# Patient Record
Sex: FEMALE | Race: WHITE | NOT HISPANIC OR LATINO | Employment: FULL TIME | URBAN - METROPOLITAN AREA
[De-identification: names, ages, dates, MRNs, and addresses within clinical notes are randomized per-mention and may not be internally consistent; named-entity substitution may affect disease eponyms.]

---

## 2017-05-16 ENCOUNTER — HISTORICAL (OUTPATIENT)
Dept: LAB | Facility: HOSPITAL | Age: 32
End: 2017-05-16

## 2017-05-16 LAB
ABS NEUT (OLG): 2.84 X10(3)/MCL (ref 2.1–9.2)
B-HCG SERPL QL: NEGATIVE
BASOPHILS # BLD AUTO: 0 X10(3)/MCL (ref 0–0.2)
BASOPHILS NFR BLD AUTO: 1 %
BUN SERPL-MCNC: 9 MG/DL (ref 7–18)
CALCIUM SERPL-MCNC: 9.5 MG/DL (ref 8.5–10.1)
CHLORIDE SERPL-SCNC: 105 MMOL/L (ref 98–107)
CO2 SERPL-SCNC: 25 MMOL/L (ref 21–32)
CREAT SERPL-MCNC: 0.63 MG/DL (ref 0.55–1.02)
EOSINOPHIL # BLD AUTO: 0 X10(3)/MCL (ref 0–0.9)
EOSINOPHIL NFR BLD AUTO: 1 %
ERYTHROCYTE [DISTWIDTH] IN BLOOD BY AUTOMATED COUNT: 12.9 % (ref 11.5–17)
GLUCOSE SERPL-MCNC: 85 MG/DL (ref 74–106)
HCT VFR BLD AUTO: 43.8 % (ref 37–47)
HGB BLD-MCNC: 14.4 GM/DL (ref 12–16)
LYMPHOCYTES # BLD AUTO: 1.8 X10(3)/MCL (ref 0.6–4.6)
LYMPHOCYTES NFR BLD AUTO: 35 %
MCH RBC QN AUTO: 28.6 PG (ref 27–31)
MCHC RBC AUTO-ENTMCNC: 32.9 GM/DL (ref 33–36)
MCV RBC AUTO: 87.1 FL (ref 80–94)
MONOCYTES # BLD AUTO: 0.4 X10(3)/MCL (ref 0.1–1.3)
MONOCYTES NFR BLD AUTO: 8 %
NEUTROPHILS # BLD AUTO: 2.84 X10(3)/MCL (ref 2.1–9.2)
NEUTROPHILS NFR BLD AUTO: 54 %
PLATELET # BLD AUTO: 225 X10(3)/MCL (ref 130–400)
PMV BLD AUTO: 11 FL (ref 9.4–12.4)
POTASSIUM SERPL-SCNC: 4.9 MMOL/L (ref 3.5–5.1)
RBC # BLD AUTO: 5.03 X10(6)/MCL (ref 4.2–5.4)
SODIUM SERPL-SCNC: 141 MMOL/L (ref 136–145)
WBC # SPEC AUTO: 5.2 X10(3)/MCL (ref 4.5–11.5)

## 2017-05-19 ENCOUNTER — HISTORICAL (OUTPATIENT)
Dept: ADMINISTRATIVE | Facility: HOSPITAL | Age: 32
End: 2017-05-19

## 2017-05-19 LAB
B-HCG FREE SERPL-ACNC: <1 MIU/ML
GROUP & RH: NORMAL
POC BETA-HCG (QUAL): NEGATIVE

## 2017-05-24 ENCOUNTER — HISTORICAL (OUTPATIENT)
Dept: ADMINISTRATIVE | Facility: HOSPITAL | Age: 32
End: 2017-05-24

## 2017-06-05 ENCOUNTER — HISTORICAL (OUTPATIENT)
Dept: ADMINISTRATIVE | Facility: HOSPITAL | Age: 32
End: 2017-06-05

## 2017-06-05 LAB
ERYTHROCYTE [DISTWIDTH] IN BLOOD BY AUTOMATED COUNT: 12.7 % (ref 11.5–17)
HCT VFR BLD AUTO: 38 % (ref 37–47)
HGB BLD-MCNC: 12.9 GM/DL (ref 12–16)
MCH RBC QN AUTO: 29.1 PG (ref 27–31)
MCHC RBC AUTO-ENTMCNC: 33.9 GM/DL (ref 33–36)
MCV RBC AUTO: 85.8 FL (ref 80–94)
PLATELET # BLD AUTO: 220 X10(3)/MCL (ref 130–400)
PMV BLD AUTO: 10.7 FL (ref 9.4–12.4)
RBC # BLD AUTO: 4.43 X10(6)/MCL (ref 4.2–5.4)
WBC # SPEC AUTO: 7.1 X10(3)/MCL (ref 4.5–11.5)

## 2018-04-26 ENCOUNTER — HISTORICAL (OUTPATIENT)
Dept: ADMINISTRATIVE | Facility: HOSPITAL | Age: 33
End: 2018-04-26

## 2018-04-26 LAB
DEPRECATED CALCIDIOL+CALCIFEROL SERPL-MC: 30 NG/ML (ref 30–80)
T3RU NFR SERPL: 31 % (ref 31–39)
T4 FREE SERPL-MCNC: 1.14 NG/DL (ref 0.76–1.46)
T4 SERPL-MCNC: 8.5 MCG/DL (ref 4.7–13.3)
TSH SERPL-ACNC: 4.17 MIU/L (ref 0.36–3.74)

## 2018-09-27 ENCOUNTER — HISTORICAL (OUTPATIENT)
Dept: ADMINISTRATIVE | Facility: HOSPITAL | Age: 33
End: 2018-09-27

## 2018-09-27 LAB
ALBUMIN SERPL-MCNC: 3.9 GM/DL (ref 3.4–5)
ALBUMIN/GLOB SERPL: 1.3 {RATIO}
ALP SERPL-CCNC: 54 UNIT/L (ref 38–126)
ALT SERPL-CCNC: 17 UNIT/L (ref 12–78)
AST SERPL-CCNC: 12 UNIT/L (ref 15–37)
BILIRUB SERPL-MCNC: 0.5 MG/DL (ref 0.2–1)
BILIRUBIN DIRECT+TOT PNL SERPL-MCNC: 0.2 MG/DL (ref 0–0.2)
BILIRUBIN DIRECT+TOT PNL SERPL-MCNC: 0.3 MG/DL (ref 0–0.8)
BUN SERPL-MCNC: 9 MG/DL (ref 7–18)
CALCIUM SERPL-MCNC: 8.8 MG/DL (ref 8.5–10.1)
CHLORIDE SERPL-SCNC: 106 MMOL/L (ref 98–107)
CO2 SERPL-SCNC: 28 MMOL/L (ref 21–32)
CREAT SERPL-MCNC: 0.66 MG/DL (ref 0.55–1.02)
ERYTHROCYTE [DISTWIDTH] IN BLOOD BY AUTOMATED COUNT: 12.8 % (ref 11.5–17)
GLOBULIN SER-MCNC: 2.9 GM/DL (ref 2.4–3.5)
GLUCOSE SERPL-MCNC: 85 MG/DL (ref 74–106)
HCT VFR BLD AUTO: 39.6 % (ref 37–47)
HGB BLD-MCNC: 13.1 GM/DL (ref 12–16)
MCH RBC QN AUTO: 28.6 PG (ref 27–31)
MCHC RBC AUTO-ENTMCNC: 33.1 GM/DL (ref 33–36)
MCV RBC AUTO: 86.5 FL (ref 80–94)
PLATELET # BLD AUTO: 195 X10(3)/MCL (ref 130–400)
PMV BLD AUTO: 10.5 FL (ref 9.4–12.4)
POTASSIUM SERPL-SCNC: 3.7 MMOL/L (ref 3.5–5.1)
PROGEST SERPL-MCNC: 4.97 NG/ML
PROT SERPL-MCNC: 6.8 GM/DL (ref 6.4–8.2)
RBC # BLD AUTO: 4.58 X10(6)/MCL (ref 4.2–5.4)
SODIUM SERPL-SCNC: 141 MMOL/L (ref 136–145)
T3FREE SERPL-MCNC: 3.22 PG/ML (ref 2.18–3.98)
T3RU NFR SERPL: 33 % (ref 31–39)
T4 FREE SERPL-MCNC: 1.06 NG/DL (ref 0.76–1.46)
T4 SERPL-MCNC: 10.3 MCG/DL (ref 4.7–13.3)
TSH SERPL-ACNC: 3.21 MIU/L (ref 0.36–3.74)
WBC # SPEC AUTO: 6.7 X10(3)/MCL (ref 4.5–11.5)

## 2020-09-03 ENCOUNTER — HISTORICAL (OUTPATIENT)
Dept: LAB | Facility: HOSPITAL | Age: 35
End: 2020-09-03

## 2020-09-03 LAB
ABS NEUT (OLG): 5.07
ALBUMIN SERPL-MCNC: 4 GM/DL (ref 3.5–5)
ALBUMIN/GLOB SERPL: 1.3 RATIO (ref 1.1–2)
ALP SERPL-CCNC: 57 UNIT/L (ref 40–150)
ALT SERPL-CCNC: 14 UNIT/L (ref 0–55)
AST SERPL-CCNC: 16 UNIT/L (ref 5–34)
BASOPHILS # BLD AUTO: 0.03 X10(3)/MCL
BASOPHILS NFR BLD AUTO: 0.4 %
BILIRUB SERPL-MCNC: 0.3 MG/DL
BILIRUBIN DIRECT+TOT PNL SERPL-MCNC: 0.1 MG/DL (ref 0–0.5)
BILIRUBIN DIRECT+TOT PNL SERPL-MCNC: 0.2 MG/DL
BUN SERPL-MCNC: 11 MG/DL (ref 7–18.7)
CALCIUM SERPL-MCNC: 9.3 MG/DL (ref 8.4–10.2)
CHLORIDE SERPL-SCNC: 108 MMOL/L (ref 98–107)
CHOLEST SERPL-MCNC: 190 MG/DL
CHOLEST/HDLC SERPL: 2 {RATIO} (ref 0–5)
CO2 SERPL-SCNC: 23 MEQ/L (ref 22–29)
CREAT SERPL-MCNC: 0.71 MG/DL (ref 0.55–1.02)
EOSINOPHIL # BLD AUTO: 0.09 X10(3)/MCL
EOSINOPHIL NFR BLD AUTO: 1.1 %
ERYTHROCYTE [DISTWIDTH] IN BLOOD BY AUTOMATED COUNT: 14 %
ESTRADIOL SERPL HS-MCNC: 104 PG/ML
FT4I SERPL CALC-MCNC: 2.44 (ref 2.6–3.6)
GLOBULIN SER-MCNC: 3 GM/DL (ref 2.4–3.5)
GLUCOSE SERPL-MCNC: 68 MG/DL (ref 74–100)
HCT VFR BLD AUTO: 38.4 % (ref 34–46)
HDLC SERPL-MCNC: 80 MG/DL (ref 35–60)
HGB BLD-MCNC: 12.7 GM/DL (ref 11.3–15.4)
IMM GRANULOCYTES # BLD AUTO: 0.01 10*3/UL (ref 0–0.1)
IMM GRANULOCYTES NFR BLD AUTO: 0.1 % (ref 0–1)
LDLC SERPL CALC-MCNC: 97 MG/DL (ref 50–140)
LYMPHOCYTES # BLD AUTO: 2.23 X10(3)/MCL
LYMPHOCYTES NFR BLD AUTO: 26.9 %
MCH RBC QN AUTO: 27.7 PG (ref 27–33)
MCHC RBC AUTO-ENTMCNC: 33.1 GM/DL (ref 32–35)
MCV RBC AUTO: 83.7 FL (ref 81–97)
MONOCYTES # BLD AUTO: 0.85 X10(3)/MCL
MONOCYTES NFR BLD AUTO: 10.3 %
NEUTROPHILS # BLD AUTO: 5.07 X10(3)/MCL
NEUTROPHILS NFR BLD AUTO: 61.2 %
PLATELET # BLD AUTO: 239 X10(3)/MCL (ref 140–450)
PMV BLD AUTO: 11 FL
POTASSIUM SERPL-SCNC: 3.9 MMOL/L (ref 3.5–5.1)
PROGEST SERPL-MCNC: 8.1 NG/ML
PROT SERPL-MCNC: 7 GM/DL (ref 6.4–8.3)
RBC # BLD AUTO: 4.59 X10(6)/MCL (ref 3.9–5)
SODIUM SERPL-SCNC: 143 MMOL/L (ref 136–145)
T3RU NFR SERPL: 32.37 % (ref 31–39)
T4 FREE SERPL-MCNC: 0.88 NG/DL (ref 0.7–1.48)
T4 SERPL-MCNC: 7.53 UG/DL (ref 4.87–11.72)
TRIGL SERPL-MCNC: 65 MG/DL (ref 37–140)
TSH SERPL-ACNC: 3.16 UIU/ML (ref 0.35–4.94)
VLDLC SERPL CALC-MCNC: 13 MG/DL
WBC # SPEC AUTO: 8.28 X10(3)/MCL (ref 3.4–9.2)

## 2021-05-20 ENCOUNTER — HISTORICAL (OUTPATIENT)
Dept: ADMINISTRATIVE | Facility: HOSPITAL | Age: 36
End: 2021-05-20

## 2021-05-20 LAB
ABS NEUT (OLG): 3.07 X10(3)/MCL (ref 2.1–9.2)
ALBUMIN SERPL-MCNC: 4.5 GM/DL (ref 3.5–5)
ALBUMIN/GLOB SERPL: 1.7 RATIO (ref 1.1–2)
ALP SERPL-CCNC: 59 UNIT/L (ref 40–150)
ALT SERPL-CCNC: 18 UNIT/L (ref 0–55)
AST SERPL-CCNC: 18 UNIT/L (ref 5–34)
BASOPHILS # BLD AUTO: 0 X10(3)/MCL (ref 0–0.2)
BASOPHILS NFR BLD AUTO: 1 %
BILIRUB SERPL-MCNC: 0.5 MG/DL
BILIRUBIN DIRECT+TOT PNL SERPL-MCNC: 0.2 MG/DL (ref 0–0.5)
BILIRUBIN DIRECT+TOT PNL SERPL-MCNC: 0.3 MG/DL (ref 0–0.8)
BUN SERPL-MCNC: 7.3 MG/DL (ref 7–18.7)
CALCIUM SERPL-MCNC: 9.7 MG/DL (ref 8.4–10.2)
CHLORIDE SERPL-SCNC: 103 MMOL/L (ref 98–107)
CO2 SERPL-SCNC: 25 MMOL/L (ref 22–29)
CREAT SERPL-MCNC: 0.68 MG/DL (ref 0.55–1.02)
DEPRECATED CALCIDIOL+CALCIFEROL SERPL-MC: 29.4 NG/ML (ref 30–80)
EOSINOPHIL # BLD AUTO: 0.1 X10(3)/MCL (ref 0–0.9)
EOSINOPHIL NFR BLD AUTO: 2 %
ERYTHROCYTE [DISTWIDTH] IN BLOOD BY AUTOMATED COUNT: 12.3 % (ref 11.5–17)
FERRITIN SERPL-MCNC: 5.45 NG/ML (ref 4.63–204)
GLOBULIN SER-MCNC: 2.6 GM/DL (ref 2.4–3.5)
GLUCOSE SERPL-MCNC: 81 MG/DL (ref 74–100)
HCT VFR BLD AUTO: 40.8 % (ref 37–47)
HGB BLD-MCNC: 13.4 GM/DL (ref 12–16)
LYMPHOCYTES # BLD AUTO: 2 X10(3)/MCL (ref 0.6–4.6)
LYMPHOCYTES NFR BLD AUTO: 34 %
MCH RBC QN AUTO: 28.5 PG (ref 27–31)
MCHC RBC AUTO-ENTMCNC: 32.8 GM/DL (ref 33–36)
MCV RBC AUTO: 86.6 FL (ref 80–94)
MONOCYTES # BLD AUTO: 0.6 X10(3)/MCL (ref 0.1–1.3)
MONOCYTES NFR BLD AUTO: 10 %
NEUTROPHILS # BLD AUTO: 3.07 X10(3)/MCL (ref 2.1–9.2)
NEUTROPHILS NFR BLD AUTO: 53 %
PLATELET # BLD AUTO: 233 X10(3)/MCL (ref 130–400)
PMV BLD AUTO: 11.3 FL (ref 9.4–12.4)
POTASSIUM SERPL-SCNC: 4.1 MMOL/L (ref 3.5–5.1)
PROT SERPL-MCNC: 7.1 GM/DL (ref 6.4–8.3)
RBC # BLD AUTO: 4.71 X10(6)/MCL (ref 4.2–5.4)
SODIUM SERPL-SCNC: 138 MMOL/L (ref 136–145)
T3FREE SERPL-MCNC: 3.4 PG/ML (ref 1.58–3.91)
T3RU NFR SERPL: 31.6 % (ref 31–39)
T4 FREE SERPL-MCNC: 0.94 NG/DL (ref 0.7–1.48)
T4 SERPL-MCNC: 8.09 UG/DL (ref 4.87–11.72)
TSH SERPL-ACNC: 3.59 UIU/ML (ref 0.35–4.94)
WBC # SPEC AUTO: 5.8 X10(3)/MCL (ref 4.5–11.5)

## 2022-04-10 ENCOUNTER — HISTORICAL (OUTPATIENT)
Dept: ADMINISTRATIVE | Facility: HOSPITAL | Age: 37
End: 2022-04-10

## 2022-04-25 VITALS
BODY MASS INDEX: 22.31 KG/M2 | HEIGHT: 62 IN | SYSTOLIC BLOOD PRESSURE: 107 MMHG | DIASTOLIC BLOOD PRESSURE: 75 MMHG | WEIGHT: 121.25 LBS

## 2022-04-30 NOTE — OP NOTE
Patient:   Susana Zuluaga            MRN: [id number]            FIN: [id number]               Age:   32 years     Sex:  Female     :  1985   Associated Diagnoses:   None   Author:   Raul Jarrell MD      Operative Note   Preoperative diagnosis:    1. right ovarian mass    Postoperative diagnosis:      1. 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 laparoscopic right salpingo-oophorectomy    Anesthesia: General endotracheal anesthesia    EBL: 15ml  UOP: 100 ml  IV: 1100 ml    Findings:  Diagnostic laparoscopy revealed normal-appearing left tube and ovary, normal-appearing uterus normal, and right tube with suspected paratubal cyst . Enlarged right ovary with contents consistent with mature ovarian teratoma.    Specimens:  1. right tube and ovary    Procedure:    After informed consent  was verified patient was taken to the operating room with IV fluid running. She was then administered general endotracheal anesthesia, and prepped and draped in low lithotomy position using stirrups. The patient's arms were tucked at her sides. A Adame catheter was placed into the bladder after which a weighted speculum was placed into the posterior fornix of vagina. With the assistance of a Jiménez retractor, the anterior lip the cervix was grasped with a Jamil tenaculum. The cervix was gently dilated using Hegar dilators and only sounded to 6 cm.  Since internal cervical os was not easily dialted, an acorn cannula was assembled and introduced into the cervix.  The tenaculum was left on the cervix and the weighted speculum was removed from the vagina.  Attention was then turned towards the abdomen where the base of the umbilicus was anesthetized with half percent Marcaine with epinephrine.   A 2cm vertical skin incision was made with scalpel at the base of the umbilicus. Veress needle was placed in the intraperitoneal cavity. Intraperitoneal placement confirmed by instillation of normal saline and  ball test.  The peritoneum was then obtained with CO2 opening pressure was noted to be 8 mmHg. Pressure was set at 12 mm of mercury.  Using an open technique, the umbical incision was carried down to the peritoneal cavity with assistance of skin retractors and small Kocher clamps    Next the GelPoint single incision laparoscopy device was assembled with 3 ports and the inner ring deployed through the umbilical incision.  At this point the patient was placed in steep Trendelenburg. The bowel was swept out of the posterior cul-de-sac. Next Trendelenburg was reduced to approximately 20°.     Diagnostic laparoscopy was performed with findings as described above. Right tube and ovary were identified, and retracted away from the pelvic sidewall. The IP ligament was cauterized and transected using the  articulating Enseal sealing and cutting device. The tube was cauterized and transected at the isthmic portion also Enseal device. Next the uterine ligament was cauterized and transected. Finally the remainder of the ovarian and tubal mesentery was cauterized and transected thereby completely covering the right tube and ovary from anatomic attachments to the pelvic sidewall. The right tube and ovary were then placed in a laparoscopic retrieval bag. While in the bag the right ovary was drained. Right tube and ovary within the close pack was then removed through the single umbilical incision.    After hemostasis was confirmed the pelvis was irrigated with warm lactated Ringer's solution.   The patient was taken out of Trendelenburg. Pelvis was again inspected and noted to be hemostatic. Approximately 700 mL of warm lactated Ringer's was left in the pelvic cavity the close of the case to both minimize postoperative adhesions and pain. Pneumoperitoneum was then evacuated. The GelPoint device was removed from the abdomen.    The fascia of the incision was closed with 0 Vicryl suture using a UR needle.    The skin incision was then  closed with 4-0 Monocryl in subcuticular fashion. The skin incision was then sealed with Dermabond. The acorn cannual was then removed from the vagina, and the Adame catheter catheter was removed at the close of the case. Instrument sponge and needle counts were correct x2. The patient was extubated and returned to the recovery room awake and in stable condition.    Complications: None

## 2022-10-06 ENCOUNTER — OFFICE VISIT (OUTPATIENT)
Dept: URGENT CARE | Facility: CLINIC | Age: 37
End: 2022-10-06
Payer: COMMERCIAL

## 2022-10-06 VITALS
TEMPERATURE: 98 F | BODY MASS INDEX: 21.66 KG/M2 | HEART RATE: 81 BPM | HEIGHT: 65 IN | WEIGHT: 130 LBS | SYSTOLIC BLOOD PRESSURE: 109 MMHG | RESPIRATION RATE: 20 BRPM | OXYGEN SATURATION: 98 % | DIASTOLIC BLOOD PRESSURE: 69 MMHG

## 2022-10-06 DIAGNOSIS — J02.9 SORE THROAT: Primary | ICD-10-CM

## 2022-10-06 DIAGNOSIS — J02.9 PHARYNGITIS, UNSPECIFIED ETIOLOGY: ICD-10-CM

## 2022-10-06 LAB
CTP QC/QA: YES
MOLECULAR STREP A: NEGATIVE
POC MOLECULAR INFLUENZA A AGN: NEGATIVE
POC MOLECULAR INFLUENZA B AGN: NEGATIVE
SARS-COV-2 RDRP RESP QL NAA+PROBE: NEGATIVE

## 2022-10-06 PROCEDURE — 3074F SYST BP LT 130 MM HG: CPT | Mod: CPTII,,, | Performed by: PHYSICIAN ASSISTANT

## 2022-10-06 PROCEDURE — 1159F PR MEDICATION LIST DOCUMENTED IN MEDICAL RECORD: ICD-10-PCS | Mod: CPTII,,, | Performed by: PHYSICIAN ASSISTANT

## 2022-10-06 PROCEDURE — 99204 PR OFFICE/OUTPT VISIT, NEW, LEVL IV, 45-59 MIN: ICD-10-PCS | Mod: ,,, | Performed by: PHYSICIAN ASSISTANT

## 2022-10-06 PROCEDURE — 3078F DIAST BP <80 MM HG: CPT | Mod: CPTII,,, | Performed by: PHYSICIAN ASSISTANT

## 2022-10-06 PROCEDURE — 3008F PR BODY MASS INDEX (BMI) DOCUMENTED: ICD-10-PCS | Mod: CPTII,,, | Performed by: PHYSICIAN ASSISTANT

## 2022-10-06 PROCEDURE — 99204 OFFICE O/P NEW MOD 45 MIN: CPT | Mod: ,,, | Performed by: PHYSICIAN ASSISTANT

## 2022-10-06 PROCEDURE — 87635: ICD-10-PCS | Mod: QW,,, | Performed by: PHYSICIAN ASSISTANT

## 2022-10-06 PROCEDURE — 87651 STREP A DNA AMP PROBE: CPT | Mod: QW,,, | Performed by: PHYSICIAN ASSISTANT

## 2022-10-06 PROCEDURE — 87502 INFLUENZA DNA AMP PROBE: CPT | Mod: QW,,, | Performed by: PHYSICIAN ASSISTANT

## 2022-10-06 PROCEDURE — 3078F PR MOST RECENT DIASTOLIC BLOOD PRESSURE < 80 MM HG: ICD-10-PCS | Mod: CPTII,,, | Performed by: PHYSICIAN ASSISTANT

## 2022-10-06 PROCEDURE — 3008F BODY MASS INDEX DOCD: CPT | Mod: CPTII,,, | Performed by: PHYSICIAN ASSISTANT

## 2022-10-06 PROCEDURE — 87635 SARS-COV-2 COVID-19 AMP PRB: CPT | Mod: QW,,, | Performed by: PHYSICIAN ASSISTANT

## 2022-10-06 PROCEDURE — 1159F MED LIST DOCD IN RCRD: CPT | Mod: CPTII,,, | Performed by: PHYSICIAN ASSISTANT

## 2022-10-06 PROCEDURE — 87651 POCT STREP A MOLECULAR: ICD-10-PCS | Mod: QW,,, | Performed by: PHYSICIAN ASSISTANT

## 2022-10-06 PROCEDURE — 3074F PR MOST RECENT SYSTOLIC BLOOD PRESSURE < 130 MM HG: ICD-10-PCS | Mod: CPTII,,, | Performed by: PHYSICIAN ASSISTANT

## 2022-10-06 PROCEDURE — 87502 POCT INFLUENZA A/B MOLECULAR: ICD-10-PCS | Mod: QW,,, | Performed by: PHYSICIAN ASSISTANT

## 2022-10-06 RX ORDER — METHYLPREDNISOLONE 4 MG/1
TABLET ORAL
Qty: 1 EACH | Refills: 0 | Status: SHIPPED | OUTPATIENT
Start: 2022-10-06

## 2022-10-06 RX ORDER — CHLORHEXIDINE GLUCONATE ORAL RINSE 1.2 MG/ML
15 SOLUTION DENTAL 2 TIMES DAILY
Qty: 473 ML | Refills: 0 | Status: SHIPPED | OUTPATIENT
Start: 2022-10-06 | End: 2022-10-13

## 2022-10-06 NOTE — PATIENT INSTRUCTIONS
Negative strep negative COVID negative flu testing today.  Recommend alternate Tylenol and ibuprofen every 4-6 hours as needed for pain inflammation fever or chills.  Recommend saltwater gargles pectin throat lozenges or oral Peridex and aseptic gargle and spit as needed for sore throat.  May start steroid Dosepak to help reduce pain and inflammation.  Recommend follow-up with primary care physician in 3-5 days for re-evaluation if not improving.

## 2022-10-06 NOTE — PROGRESS NOTES
"Subjective:       Patient ID: Susana Zuluaga is a 37 y.o. female.    Vitals:  height is 5' 5" (1.651 m) and weight is 59 kg (130 lb). Her oral temperature is 98.1 °F (36.7 °C). Her blood pressure is 109/69 and her pulse is 81. Her respiration is 20 and oxygen saturation is 98%.     Chief Complaint: Sore Throat (Sore throat, left-sided chest pain, fatigue x 1 day. 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          Constitution: Negative for chills, fatigue and fever.   HENT:  Positive for congestion, postnasal drip and sore throat. Negative for ear pain, sinus pain, sinus pressure, trouble swallowing and voice change.    Neck: Negative for neck pain and neck swelling.   Cardiovascular:  Negative for sob on exertion.   Respiratory:  Negative for cough, shortness of breath, stridor and wheezing.    Gastrointestinal: Negative.    Musculoskeletal:  Negative for pain, joint pain, back pain and muscle ache.   Skin: Negative.    Allergic/Immunologic: Negative.    Neurological:  Negative for headaches and altered mental status.   Psychiatric/Behavioral:  Negative for altered mental status.      Objective:      Physical Exam   Constitutional: She is oriented to person, place, and time. She appears well-developed. She is cooperative.  Non-toxic appearance. She does not appear ill. No distress.      Comments:Awake alert smiling ambulatory female speaks in complete sentences     HENT:   Head: Normocephalic.   Ears:   Right Ear: Hearing, tympanic membrane, external ear and ear canal normal.   Left Ear: Hearing, tympanic membrane, external ear and ear canal normal.   Nose: Congestion present. No mucosal edema, rhinorrhea or nasal deformity. No epistaxis. Right sinus " exhibits no maxillary sinus tenderness and no frontal sinus tenderness. Left sinus exhibits no maxillary sinus tenderness and no frontal sinus tenderness.   Mouth/Throat: Uvula is midline, oropharynx is clear and moist and mucous membranes are normal. Mucous membranes are moist. No trismus in the jaw. Normal dentition. No uvula swelling. No oropharyngeal exudate, posterior oropharyngeal edema or posterior oropharyngeal erythema.   Eyes: Conjunctivae and lids are normal. No scleral icterus.   Neck: Trachea normal and phonation normal. Neck supple. No edema present. No erythema present. No neck rigidity present.   Cardiovascular: Normal rate, regular rhythm, normal heart sounds and normal pulses.   Pulmonary/Chest: Effort normal and breath sounds normal. No respiratory distress. She has no decreased breath sounds. She has no wheezes. She has no rhonchi. She has no rales.   Abdominal: Normal appearance. She exhibits no distension. flat abdomen There is no abdominal tenderness.   Musculoskeletal: Normal range of motion.         General: Normal range of motion.      Cervical back: She exhibits no tenderness.   Neurological: no focal deficit. She is alert and oriented to person, place, and time. She exhibits normal muscle tone. Coordination normal.   Skin: Skin is warm, dry, intact, not diaphoretic and not pale. Capillary refill takes less than 2 seconds.   Psychiatric: Her speech is normal and behavior is normal. Mood, judgment and thought content normal.   Nursing note and vitals reviewed.         Previous History      Review of patient's allergies indicates:   Allergen Reactions    Lortab [hydrocodone-acetaminophen]     Penicillins        Past Medical History:   Diagnosis Date    Known health problems: none      Current Outpatient Medications   Medication Instructions    chlorhexidine (PERIDEX) 0.12 % solution 15 mLs, Mouth/Throat, 2 times daily    methylPREDNISolone (MEDROL DOSEPACK) 4 mg tablet use as directed  "    Past Surgical History:   Procedure Laterality Date    ADENOIDECTOMY      OOPHORECTOMY      TONSILLECTOMY      WISDOM TOOTH EXTRACTION       Family History   Problem Relation Age of Onset    Irritable bowel syndrome Mother     SUKHWINDER disease Father     Celiac disease Sister        Social History     Tobacco Use    Smoking status: Former     Types: Cigarettes    Smokeless tobacco: Never   Substance Use Topics    Alcohol use: Not Currently    Drug use: Not Currently        Physical Exam      Vital Signs Reviewed   /69 (BP Location: Left arm)   Pulse 81   Temp 98.1 °F (36.7 °C) (Oral)   Resp 20   Ht 5' 5" (1.651 m)   Wt 59 kg (130 lb)   LMP 10/01/2022 (Exact Date)   SpO2 98%   BMI 21.63 kg/m²        Procedures    Procedures     Labs     Results for orders placed or performed in visit on 10/06/22   POCT Strep A, Molecular   Result Value Ref Range    Molecular Strep A, POC Negative Negative     Acceptable Yes    POCT Influenza A/B MOLECULAR   Result Value Ref Range    POC Molecular Influenza A Ag Negative Negative, Not Reported    POC Molecular Influenza B Ag Negative Negative, Not Reported     Acceptable Yes    POCT COVID-19 Rapid Screening   Result Value Ref Range    POC Rapid COVID Negative Negative     Acceptable Yes        Assessment:       1. Sore throat    2. Pharyngitis, unspecified etiology            Plan:       Negative strep negative COVID negative flu testing today.  Recommend alternate Tylenol and ibuprofen every 4-6 hours as needed for pain inflammation fever or chills.  Recommend saltwater gargles pectin throat lozenges or oral Peridex and aseptic gargle and spit as needed for sore throat.  May start steroid Dosepak to help reduce pain and inflammation.  Recommend follow-up with primary care physician in 3-5 days for re-evaluation if not improving.  Sore throat  -     POCT Strep A, Molecular  -     POCT Influenza A/B MOLECULAR  -     POCT COVID-19 " Rapid Screening    Pharyngitis, unspecified etiology  -     chlorhexidine (PERIDEX) 0.12 % solution; Use as directed 15 mLs in the mouth or throat 2 (two) times daily. for 7 days  Dispense: 473 mL; Refill: 0  -     methylPREDNISolone (MEDROL DOSEPACK) 4 mg tablet; use as directed  Dispense: 1 each; Refill: 0

## 2022-12-07 ENCOUNTER — LAB VISIT (OUTPATIENT)
Dept: LAB | Facility: HOSPITAL | Age: 37
End: 2022-12-07
Attending: STUDENT IN AN ORGANIZED HEALTH CARE EDUCATION/TRAINING PROGRAM
Payer: COMMERCIAL

## 2022-12-07 DIAGNOSIS — R53.83 FATIGUE, UNSPECIFIED TYPE: Primary | ICD-10-CM

## 2022-12-07 DIAGNOSIS — N94.3 PMS (PREMENSTRUAL SYNDROME): ICD-10-CM

## 2022-12-07 PROBLEM — N64.4 BREAST PAIN: Status: ACTIVE | Noted: 2022-12-07

## 2022-12-07 LAB
DEPRECATED CALCIDIOL+CALCIFEROL SERPL-MC: 41.4 NG/ML (ref 30–80)
ERYTHROCYTE [DISTWIDTH] IN BLOOD BY AUTOMATED COUNT: 13.1 % (ref 11.5–17)
HCT VFR BLD AUTO: 40.5 % (ref 37–47)
HGB BLD-MCNC: 13.1 GM/DL (ref 12–16)
MAGNESIUM SERPL-MCNC: 1.7 MG/DL (ref 1.6–2.6)
MCH RBC QN AUTO: 28.1 PG (ref 27–31)
MCHC RBC AUTO-ENTMCNC: 32.3 MG/DL (ref 33–36)
MCV RBC AUTO: 86.7 FL (ref 80–94)
NRBC BLD AUTO-RTO: 0 %
PLATELET # BLD AUTO: 247 X10(3)/MCL (ref 130–400)
PMV BLD AUTO: 11.1 FL (ref 7.4–10.4)
PROGEST SERPL-MCNC: 0.7 NG/ML
RBC # BLD AUTO: 4.67 X10(6)/MCL (ref 4.2–5.4)
T3RU NFR SERPL: 34.19 % (ref 31–39)
T4 FREE SERPL-MCNC: 0.96 NG/DL (ref 0.7–1.48)
TSH SERPL-ACNC: 2.98 UIU/ML (ref 0.35–4.94)
WBC # SPEC AUTO: 6.4 X10(3)/MCL (ref 4.5–11.5)

## 2022-12-07 PROCEDURE — 84436 ASSAY OF TOTAL THYROXINE: CPT

## 2022-12-07 PROCEDURE — 84479 ASSAY OF THYROID (T3 OR T4): CPT

## 2022-12-07 PROCEDURE — 84439 ASSAY OF FREE THYROXINE: CPT

## 2022-12-07 PROCEDURE — 83520 IMMUNOASSAY QUANT NOS NONAB: CPT

## 2022-12-07 PROCEDURE — 82306 VITAMIN D 25 HYDROXY: CPT

## 2022-12-07 PROCEDURE — 86376 MICROSOMAL ANTIBODY EACH: CPT

## 2022-12-07 PROCEDURE — 83735 ASSAY OF MAGNESIUM: CPT

## 2022-12-07 PROCEDURE — 85027 COMPLETE CBC AUTOMATED: CPT

## 2022-12-07 PROCEDURE — 84443 ASSAY THYROID STIM HORMONE: CPT

## 2022-12-07 PROCEDURE — 36415 COLL VENOUS BLD VENIPUNCTURE: CPT

## 2022-12-07 PROCEDURE — 84144 ASSAY OF PROGESTERONE: CPT

## 2022-12-08 LAB
T4 SERPL IA-MCNC: 8.3 MCG/DL (ref 4.5–11.7)
TSH RECEP AB SER-ACNC: <1.1 IU/L (ref 0–1.75)

## 2022-12-14 LAB
THYROID PEROXIDASE (OLG): NEGATIVE
THYROID PEROXIDASE QUANT (OLG): 15 IU/ML

## 2023-08-28 ENCOUNTER — OFFICE VISIT (OUTPATIENT)
Dept: URGENT CARE | Facility: CLINIC | Age: 38
End: 2023-08-28
Payer: COMMERCIAL

## 2023-08-28 VITALS
SYSTOLIC BLOOD PRESSURE: 111 MMHG | HEART RATE: 69 BPM | WEIGHT: 130 LBS | DIASTOLIC BLOOD PRESSURE: 71 MMHG | HEIGHT: 66 IN | RESPIRATION RATE: 18 BRPM | TEMPERATURE: 98 F | OXYGEN SATURATION: 98 % | BODY MASS INDEX: 20.89 KG/M2

## 2023-08-28 DIAGNOSIS — J02.9 SORE THROAT: Primary | ICD-10-CM

## 2023-08-28 DIAGNOSIS — J02.9 ACUTE PHARYNGITIS, UNSPECIFIED ETIOLOGY: ICD-10-CM

## 2023-08-28 LAB
CTP QC/QA: YES
MOLECULAR STREP A: NEGATIVE

## 2023-08-28 PROCEDURE — 87651 STREP A DNA AMP PROBE: CPT | Mod: QW,,, | Performed by: PHYSICIAN ASSISTANT

## 2023-08-28 PROCEDURE — 99213 OFFICE O/P EST LOW 20 MIN: CPT | Mod: ,,, | Performed by: PHYSICIAN ASSISTANT

## 2023-08-28 PROCEDURE — 99213 PR OFFICE/OUTPT VISIT, EST, LEVL III, 20-29 MIN: ICD-10-PCS | Mod: ,,, | Performed by: PHYSICIAN ASSISTANT

## 2023-08-28 PROCEDURE — 87651 POCT STREP A MOLECULAR: ICD-10-PCS | Mod: QW,,, | Performed by: PHYSICIAN ASSISTANT

## 2023-08-28 RX ORDER — AZITHROMYCIN 250 MG/1
TABLET, FILM COATED ORAL
Qty: 6 TABLET | Refills: 0 | Status: SHIPPED | OUTPATIENT
Start: 2023-08-28 | End: 2023-09-02

## 2023-08-28 NOTE — PATIENT INSTRUCTIONS
Negative strep testing today.    Continue monitoring throat and if fever develops in the next 3-5 days may start azithromycin antibiotic backup coverage.  Recommend alternate Tylenol and ibuprofen along with daily decongestant antihistamine nasal spray if needed for upper respiratory symptoms.  Recommend follow-up with primary care physician 1 week for re-evaluation if not improving.

## 2023-08-28 NOTE — PROGRESS NOTES
"Subjective:      Patient ID: Susana Zuluaga is a 38 y.o. female.    Vitals:  height is 5' 6" (1.676 m) and weight is 59 kg (130 lb). Her temperature is 98.4 °F (36.9 °C). Her blood pressure is 111/71 and her pulse is 69. Her respiration is 18 and oxygen saturation is 98%.     Chief Complaint: Sore Throat (Pt presents to clinic with possible strep. Exposed to strep)    HPI  female reports having 3 over children with strep pharyngitis at home now developing herself scratchy sore throat in the last 24 hours after eating and drinking after children last weekend presents to urgent care for evaluation.  Patient reports also having mild frontal headache currently working as  viral sick contacts defers to home COVID testing.   Sore Throat     Additional comments: Pt presents to clinic with possible strep. Exposed   to strep    Sore Throat   This is a new problem. The current episode started yesterday. There has been no fever. Associated symptoms include headaches. Pertinent negatives include no congestion, coughing, ear pain, neck pain, shortness of breath, stridor or trouble swallowing.       Constitution: Negative for chills, fatigue and fever.   HENT:  Positive for sore throat. Negative for ear pain, congestion, sinus pain, sinus pressure, trouble swallowing and voice change.    Neck: Negative for neck pain and neck swelling.   Respiratory:  Negative for cough, shortness of breath, stridor and wheezing.    Gastrointestinal: Negative.    Musculoskeletal:  Negative for pain, joint pain, back pain and muscle ache.   Skin: Negative.  Negative for erythema.   Allergic/Immunologic: Negative.    Neurological:  Positive for headaches. Negative for altered mental status.   Psychiatric/Behavioral:  Negative for altered mental status.       Objective:     Physical Exam   Constitutional: She is oriented to person, place, and time. She appears well-developed. She is cooperative.  Non-toxic appearance.      " Comments:Awake alert ambulatory female     HENT:   Head: Normocephalic.   Ears:   Right Ear: Hearing, tympanic membrane, external ear and ear canal normal.   Left Ear: Hearing, tympanic membrane, external ear and ear canal normal.   Nose: Nose normal. No mucosal edema, rhinorrhea, nasal deformity or congestion. No epistaxis. Right sinus exhibits no maxillary sinus tenderness and no frontal sinus tenderness. Left sinus exhibits no maxillary sinus tenderness and no frontal sinus tenderness.   Mouth/Throat: Uvula is midline and mucous membranes are normal. Mucous membranes are moist. No trismus in the jaw. Normal dentition. No uvula swelling. Posterior oropharyngeal erythema present. No oropharyngeal exudate or posterior oropharyngeal edema.      Comments: Left 1+ edema  Eyes: Conjunctivae and lids are normal. No scleral icterus.   Neck: Trachea normal and phonation normal. Neck supple. No edema present. No erythema present. No neck rigidity present.   Cardiovascular: Normal rate, regular rhythm, normal heart sounds and normal pulses.   No murmur heard.Exam reveals no gallop.   Pulmonary/Chest: Effort normal and breath sounds normal. No stridor. No respiratory distress. She has no decreased breath sounds. She has no wheezes. She has no rhonchi. She has no rales.   Musculoskeletal: Normal range of motion.         General: Normal range of motion.      Cervical back: She exhibits no tenderness.   Lymphadenopathy:     She has no cervical adenopathy.   Neurological: no focal deficit. She is alert and oriented to person, place, and time. She displays no weakness. No cranial nerve deficit. She exhibits normal muscle tone.   Skin: Skin is warm, dry, intact, not diaphoretic, not pale and no rash. No erythema   Psychiatric: Her speech is normal and behavior is normal. Mood, judgment and thought content normal.   Nursing note and vitals reviewed.         Previous History      Review of patient's allergies indicates:   Allergen  "Reactions    Lortab [hydrocodone-acetaminophen] Shortness Of Breath    Penicillins Hives       Past Medical History:   Diagnosis Date    Abnormal Pap smear of cervix 2011    Kidney stones 2011     Current Outpatient Medications   Medication Instructions    azithromycin (Z-NICOLE) 250 MG tablet Take 2 tablets by mouth on day 1; Take 1 tablet by mouth on days 2-5<BR>    magnesium oxide (MAGOX) 400 mg, Oral, Daily    methylPREDNISolone (MEDROL DOSEPACK) 4 mg tablet use as directed    progesterone (PROMETRIUM) 100 MG capsule Place one capsule nightly starting cycle day 15 and ending cycle day 27.  If 1 capsule nightly does not have desired effect, may increase to two capsules nightly     Past Surgical History:   Procedure Laterality Date    ADENOIDECTOMY      OOPHORECTOMY      TONSILLECTOMY      WISDOM TOOTH EXTRACTION       Family History   Problem Relation Age of Onset    Irritable bowel syndrome Mother     SUKHWINDER disease Father     Celiac disease Sister        Social History     Tobacco Use    Smoking status: Former     Types: Cigarettes    Smokeless tobacco: Never   Substance Use Topics    Alcohol use: Not Currently    Drug use: Not Currently        Physical Exam      Vital Signs Reviewed   /71   Pulse 69   Temp 98.4 °F (36.9 °C)   Resp 18   Ht 5' 6" (1.676 m)   Wt 59 kg (130 lb)   SpO2 98%   BMI 20.98 kg/m²        Procedures    Procedures     Labs     Results for orders placed or performed in visit on 08/28/23   POCT Strep A, Molecular   Result Value Ref Range    Molecular Strep A, POC Negative Negative     Acceptable Yes        Assessment:     1. Sore throat    2. Acute pharyngitis, unspecified etiology        Plan:   Negative strep testing today.    Continue monitoring throat and if fever develops in the next 3-5 days may start azithromycin antibiotic backup coverage.  Recommend alternate Tylenol and ibuprofen along with daily decongestant antihistamine nasal spray if needed for upper " respiratory symptoms.  Recommend follow-up with primary care physician 1 week for re-evaluation if not improving.    Sore throat  -     POCT Strep A, Molecular    Acute pharyngitis, unspecified etiology    Other orders  -     azithromycin (Z-NICOLE) 250 MG tablet; Take 2 tablets by mouth on day 1; Take 1 tablet by mouth on days 2-5  Dispense: 6 tablet; Refill: 0

## 2024-02-02 ENCOUNTER — OFFICE VISIT (OUTPATIENT)
Dept: URGENT CARE | Facility: CLINIC | Age: 39
End: 2024-02-02
Payer: COMMERCIAL

## 2024-02-02 VITALS
DIASTOLIC BLOOD PRESSURE: 73 MMHG | WEIGHT: 135 LBS | TEMPERATURE: 99 F | BODY MASS INDEX: 21.69 KG/M2 | HEART RATE: 84 BPM | RESPIRATION RATE: 18 BRPM | HEIGHT: 66 IN | SYSTOLIC BLOOD PRESSURE: 107 MMHG | OXYGEN SATURATION: 97 %

## 2024-02-02 DIAGNOSIS — S16.1XXA STRAIN OF NECK MUSCLE, INITIAL ENCOUNTER: ICD-10-CM

## 2024-02-02 DIAGNOSIS — S09.93XA FACIAL INJURY, INITIAL ENCOUNTER: ICD-10-CM
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
## 2024-02-02 PROCEDURE — 99213 OFFICE O/P EST LOW 20 MIN: CPT | Mod: ,,, | Performed by: PHYSICIAN ASSISTANT

## 2024-02-02 RX ORDER — METHOCARBAMOL 500 MG/1
500 TABLET, FILM COATED ORAL 3 TIMES DAILY
Qty: 15 TABLET | Refills: 0 | Status: SHIPPED | OUTPATIENT
Start: 2024-02-02 | End: 2024-02-07
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
## 2024-02-07 ENCOUNTER — HOSPITAL ENCOUNTER (EMERGENCY)
Facility: HOSPITAL | Age: 39
Discharge: HOME | End: 2024-02-07
Attending: EMERGENCY MEDICINE
Payer: COMMERCIAL

## 2024-02-07 VITALS
RESPIRATION RATE: 15 BRPM | HEART RATE: 71 BPM | DIASTOLIC BLOOD PRESSURE: 69 MMHG | BODY MASS INDEX: 21.69 KG/M2 | TEMPERATURE: 98 F | SYSTOLIC BLOOD PRESSURE: 108 MMHG | WEIGHT: 135 LBS | HEIGHT: 66 IN | OXYGEN SATURATION: 99 %

## 2024-02-07 DIAGNOSIS — S06.0X0A CONCUSSION WITHOUT LOSS OF CONSCIOUSNESS, INITIAL ENCOUNTER: ICD-10-CM
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
## 2024-02-07 DIAGNOSIS — H57.11 PAIN OF RIGHT ORBIT: ICD-10-CM

## 2024-02-07 DIAGNOSIS — T14.8XXA BRUISING: ICD-10-CM

## 2024-02-07 PROCEDURE — 99284 EMERGENCY DEPT VISIT MOD MDM: CPT | Mod: 25

## 2024-02-07 NOTE — Clinical Note
"Susana Robleselle" Margareth was seen and treated in our emergency department on 2/7/2024.  She may return to work on 02/15/2024.       If you have any questions or concerns, please don't hesitate to call.      Josefina Velez, NP"
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
## 2024-02-07 NOTE — Clinical Note
"Susana Robleselle" Margareth was seen and treated in our emergency department on 2/7/2024.  She may return to school on 02/15/2024.      If you have any questions or concerns, please don't hesitate to call.      Josefina Velez, NP"

## 2024-04-11 ENCOUNTER — OFFICE VISIT (OUTPATIENT)
Dept: URGENT CARE | Facility: CLINIC | Age: 39
End: 2024-04-11
Payer: COMMERCIAL

## 2024-04-11 VITALS
HEIGHT: 66 IN | DIASTOLIC BLOOD PRESSURE: 75 MMHG | RESPIRATION RATE: 20 BRPM | TEMPERATURE: 98 F | HEART RATE: 83 BPM | SYSTOLIC BLOOD PRESSURE: 129 MMHG | OXYGEN SATURATION: 98 % | BODY MASS INDEX: 21.69 KG/M2 | WEIGHT: 135 LBS

## 2024-04-11 DIAGNOSIS — F41.9 ANXIETY: ICD-10-CM

## 2024-04-11 DIAGNOSIS — R10.12 LUQ ABDOMINAL PAIN: Primary | ICD-10-CM

## 2024-04-11 LAB
ALBUMIN SERPL-MCNC: 4.2 G/DL (ref 3.5–5)
ALBUMIN/GLOB SERPL: 1.7 RATIO (ref 1.1–2)
ALP SERPL-CCNC: 59 UNIT/L (ref 40–150)
ALT SERPL-CCNC: 19 UNIT/L (ref 0–55)
AST SERPL-CCNC: 19 UNIT/L (ref 5–34)
BASOPHILS # BLD AUTO: 0.05 X10(3)/MCL
BASOPHILS NFR BLD AUTO: 0.6 %
BILIRUB SERPL-MCNC: 0.3 MG/DL
BUN SERPL-MCNC: 12.5 MG/DL (ref 7–18.7)
CALCIUM SERPL-MCNC: 9.3 MG/DL (ref 8.4–10.2)
CHLORIDE SERPL-SCNC: 104 MMOL/L (ref 98–107)
CO2 SERPL-SCNC: 28 MMOL/L (ref 22–29)
CREAT SERPL-MCNC: 0.81 MG/DL (ref 0.55–1.02)
EOSINOPHIL # BLD AUTO: 0.14 X10(3)/MCL (ref 0–0.9)
EOSINOPHIL NFR BLD AUTO: 1.6 %
ERYTHROCYTE [DISTWIDTH] IN BLOOD BY AUTOMATED COUNT: 13 % (ref 11.5–17)
GFR SERPLBLD CREATININE-BSD FMLA CKD-EPI: >60 MLS/MIN/1.73/M2
GLOBULIN SER-MCNC: 2.5 GM/DL (ref 2.4–3.5)
GLUCOSE SERPL-MCNC: 86 MG/DL (ref 74–100)
HCT VFR BLD AUTO: 39.6 % (ref 37–47)
HGB BLD-MCNC: 13.7 G/DL (ref 12–16)
IMM GRANULOCYTES # BLD AUTO: 0.02 X10(3)/MCL (ref 0–0.04)
IMM GRANULOCYTES NFR BLD AUTO: 0.2 %
LIPASE SERPL-CCNC: 32 U/L
LYMPHOCYTES # BLD AUTO: 2.61 X10(3)/MCL (ref 0.6–4.6)
LYMPHOCYTES NFR BLD AUTO: 30.6 %
MCH RBC QN AUTO: 30 PG (ref 27–31)
MCHC RBC AUTO-ENTMCNC: 34.6 G/DL (ref 33–36)
MCV RBC AUTO: 86.8 FL (ref 80–94)
MONOCYTES # BLD AUTO: 0.63 X10(3)/MCL (ref 0.1–1.3)
MONOCYTES NFR BLD AUTO: 7.4 %
NEUTROPHILS # BLD AUTO: 5.08 X10(3)/MCL (ref 2.1–9.2)
NEUTROPHILS NFR BLD AUTO: 59.6 %
NRBC BLD AUTO-RTO: 0 %
PLATELET # BLD AUTO: 226 X10(3)/MCL (ref 130–400)
PMV BLD AUTO: 10.5 FL (ref 7.4–10.4)
POTASSIUM SERPL-SCNC: 3.8 MMOL/L (ref 3.5–5.1)
PROT SERPL-MCNC: 6.7 GM/DL (ref 6.4–8.3)
RBC # BLD AUTO: 4.56 X10(6)/MCL (ref 4.2–5.4)
SODIUM SERPL-SCNC: 139 MMOL/L (ref 136–145)
T4 FREE SERPL-MCNC: 1.13 NG/DL (ref 0.7–1.48)
TSH SERPL-ACNC: 3.06 UIU/ML (ref 0.35–4.94)
WBC # SPEC AUTO: 8.53 X10(3)/MCL (ref 4.5–11.5)

## 2024-04-11 PROCEDURE — 84439 ASSAY OF FREE THYROXINE: CPT | Performed by: PHYSICIAN ASSISTANT

## 2024-04-11 PROCEDURE — 99214 OFFICE O/P EST MOD 30 MIN: CPT | Mod: ,,, | Performed by: PHYSICIAN ASSISTANT

## 2024-04-11 PROCEDURE — 85025 COMPLETE CBC W/AUTO DIFF WBC: CPT | Performed by: PHYSICIAN ASSISTANT

## 2024-04-11 PROCEDURE — 84443 ASSAY THYROID STIM HORMONE: CPT | Performed by: PHYSICIAN ASSISTANT

## 2024-04-11 PROCEDURE — 80053 COMPREHEN METABOLIC PANEL: CPT | Performed by: PHYSICIAN ASSISTANT

## 2024-04-11 PROCEDURE — 83690 ASSAY OF LIPASE: CPT | Performed by: PHYSICIAN ASSISTANT

## 2024-04-11 PROCEDURE — 36415 COLL VENOUS BLD VENIPUNCTURE: CPT | Performed by: PHYSICIAN ASSISTANT

## 2024-04-11 RX ORDER — OMEPRAZOLE 40 MG/1
40 CAPSULE, DELAYED RELEASE ORAL DAILY
Qty: 14 CAPSULE | Refills: 1 | Status: SHIPPED | OUTPATIENT
Start: 2024-04-11 | End: 2024-04-25

## 2024-04-11 RX ORDER — SUCRALFATE 1 G/10ML
1 SUSPENSION ORAL 4 TIMES DAILY
Qty: 200 ML | Refills: 0 | Status: SHIPPED | OUTPATIENT
Start: 2024-04-11 | End: 2024-04-16

## 2024-04-11 RX ORDER — ONDANSETRON 8 MG/1
8 TABLET, ORALLY DISINTEGRATING ORAL 3 TIMES DAILY PRN
Qty: 6 TABLET | Refills: 0 | Status: SHIPPED | OUTPATIENT
Start: 2024-04-11 | End: 2024-04-13

## 2024-04-11 NOTE — PATIENT INSTRUCTIONS
Recommend proton pump inhibitor omeprazole daily over the next 1-2 weeks to help reduce stomach acid.  Recommend Carafate to help reduce esophageal irritation.  Recommend continue bland diet avoiding spicy foods, caffeine, irritated food or drink.  We will contact you with results from blood work as soon as available.  Gastroenterology referral will be sent for left upper quadrant follow-up evaluation and continued care planning.  Recommend follow-up with primary care physician in the next 1-2 weeks for continued monitoring.  Recommended emergency department evaluation sooner if abdominal or chest pain worsens.

## 2024-04-11 NOTE — PROGRESS NOTES
"Subjective:      Patient ID: Susana Zuluaga is a 38 y.o. female.    Vitals:  height is 5' 6" (1.676 m) and weight is 61.2 kg (135 lb). Her oral temperature is 98.1 °F (36.7 °C). Her blood pressure is 129/75 and her pulse is 83. Her respiration is 20 and oxygen saturation is 98%.     Chief Complaint: Abdominal Pain    Female reports having acid reflux last night with esophageal irritation and epigastric/chest pain at bedtime waking up again at 3:00 a.m. with left upper quadrant pain using water to help reduce irritation no over-the-counter medication presents to urgent care this afternoon for initial evaluation.  Patient reports having similar episode 1 week ago with spontaneous resolved.  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  Patient reports having Hashimoto's thyroiditis monitored by her physician not on medication with last thyroid labs 5 months ago within normal limits.  Patient reports history of pyloric stenosis as infant requiring surgical correction with long-term GERD utilizing periodic homeopathic medication not on over-the-counter or prescription medication.      Abdominal Pain  This is a new problem. The current episode started yesterday. The pain is located in the LUQ. The abdominal pain radiates to the epigastric region and chest. Associated symptoms include nausea. Pertinent negatives include no constipation, diarrhea, hematochezia or vomiting.       HENT: Negative.     Cardiovascular: Negative.    Respiratory:  Negative for cough, shortness of breath and asthma.    Gastrointestinal:  Positive for abdominal pain, nausea and heartburn. Negative for vomiting, constipation, diarrhea, bright red blood in stool and dark colored stools.   Genitourinary: Negative.    Skin:  Negative for erythema.   Allergic/Immunologic: Negative for asthma.   Neurological:  Negative for altered mental status.   Psychiatric/Behavioral:  Positive for nervous/anxious and sleep " disturbance. Negative for altered mental status. The patient is nervous/anxious.       Objective:     Physical Exam   Constitutional: She is oriented to person, place, and time. She appears well-developed.  Non-toxic appearance.      Comments:Awake alert ambulatory thin female     HENT:   Head: Normocephalic.   Mouth/Throat: Mucous membranes are normal. Mucous membranes are moist.   Eyes: Conjunctivae and lids are normal.   Neck: Trachea normal. Neck supple.   Cardiovascular: Normal rate, regular rhythm, normal heart sounds and normal pulses.   No murmur heard.Exam reveals no gallop and no friction rub.   Pulmonary/Chest: Effort normal and breath sounds normal. No stridor. No respiratory distress. She has no wheezes. She has no rhonchi. She has no rales.   Abdominal: Normal appearance and bowel sounds are normal. She exhibits no distension and no mass. Soft. flat abdomen There is no abdominal tenderness. There is no guarding.      Comments: Abdomen soft nontender all quadrants including left upper quadrant   Musculoskeletal: Normal range of motion.         General: Normal range of motion.   Neurological: no focal deficit. She is alert and oriented to person, place, and time. She has normal strength. She displays no weakness.   Skin: Skin is warm, dry, intact, not diaphoretic, not pale and no rash. No erythema jaundice  Psychiatric: Her mood appears anxious. Her speech is tangential.   Nursing note and vitals reviewed.       Previous History      Review of patient's allergies indicates:   Allergen Reactions    Lortab [hydrocodone-acetaminophen] Shortness Of Breath    Corticosteroids (glucocorticoids)     Penicillins Hives       Past Medical History:   Diagnosis Date    Abnormal Pap smear of cervix 2011    EBV (Shannan-Barr virus) viremia     Kidney stones 2011    Thyroid disease      Current Outpatient Medications   Medication Instructions    magnesium oxide (MAGOX) 400 mg, Oral, Daily    methylPREDNISolone  "(MEDROL DOSEPACK) 4 mg tablet use as directed    omeprazole (PRILOSEC) 40 mg, Oral, Daily    ondansetron (ZOFRAN-ODT) 8 mg, Oral, 3 times daily PRN    progesterone (PROMETRIUM) 100 MG capsule Place one capsule nightly starting cycle day 15 and ending cycle day 27.  If 1 capsule nightly does not have desired effect, may increase to two capsules nightly    sucralfate (CARAFATE) 1 g, Oral, 4 times daily     Past Surgical History:   Procedure Laterality Date    ADENOIDECTOMY      OOPHORECTOMY      TONSILLECTOMY      WISDOM TOOTH EXTRACTION       Family History   Problem Relation Age of Onset    Irritable bowel syndrome Mother     SUKHWINDER disease Father     Celiac disease Sister        Social History     Tobacco Use    Smoking status: Former     Types: Cigarettes    Smokeless tobacco: Never   Substance Use Topics    Alcohol use: Not Currently    Drug use: Not Currently        Physical Exam      Vital Signs Reviewed   /75 (BP Location: Right arm)   Pulse 83   Temp 98.1 °F (36.7 °C) (Oral)   Resp 20   Ht 5' 6" (1.676 m)   Wt 61.2 kg (135 lb)   LMP 04/05/2024   SpO2 98%   BMI 21.79 kg/m²        Procedures    Procedures     Labs     Results for orders placed or performed in visit on 08/28/23   POCT Strep A, Molecular   Result Value Ref Range    Molecular Strep A, POC Negative Negative     Acceptable Yes          Assessment:     1. LUQ abdominal pain    2. Anxiety        Plan:   Patient A&O x4 hemodynamically stable in clinic today.  Abdomen soft nontender all quadrants on exam.  Discuss concern for GERD, erosive esophagitis, left upper quadrant abdominal pain and plan for prescription PPI along with GI referral.  Patient acknowledges anxiety accepts lab work declines prescription anxiety medication.  Patient encouraged PCP follow-up with URI referral to be sent tonight and strict emergency department precautions if pain worsens.  Patient verbalized understanding satisfied ready for " discharge now    Recommend proton pump inhibitor omeprazole daily over the next 1-2 weeks to help reduce stomach acid.  Recommend Carafate to help reduce esophageal irritation.  Recommend continue bland diet avoiding spicy foods, caffeine, irritated food or drink.  We will contact you with results from blood work as soon as available.  Gastroenterology referral will be sent for left upper quadrant follow-up evaluation and continued care planning.  Recommend follow-up with primary care physician in the next 1-2 weeks for continued monitoring.  Recommended emergency department evaluation sooner if abdominal or chest pain worsens.  LUQ abdominal pain  -     CBC Auto Differential; Future; Expected date: 04/11/2024  -     Comprehensive Metabolic Panel; Future; Expected date: 04/11/2024  -     Lipase; Future; Expected date: 04/11/2024  -     Ambulatory referral/consult to Gastroenterology    Anxiety  -     TSH; Future; Expected date: 04/11/2024  -     T4, Free; Future; Expected date: 04/11/2024    Other orders  -     omeprazole (PRILOSEC) 40 MG capsule; Take 1 capsule (40 mg total) by mouth once daily. for 14 days  Dispense: 14 capsule; Refill: 1  -     ondansetron (ZOFRAN-ODT) 8 MG TbDL; Take 1 tablet (8 mg total) by mouth 3 (three) times daily as needed (nausea or vomiting).  Dispense: 6 tablet; Refill: 0  -     sucralfate (CARAFATE) 100 mg/mL suspension; Take 10 mLs (1 g total) by mouth 4 (four) times daily. for 5 days  Dispense: 200 mL; Refill: 0